# Patient Record
Sex: FEMALE | Race: WHITE
[De-identification: names, ages, dates, MRNs, and addresses within clinical notes are randomized per-mention and may not be internally consistent; named-entity substitution may affect disease eponyms.]

---

## 2017-07-05 ENCOUNTER — HOSPITAL ENCOUNTER (OUTPATIENT)
Dept: HOSPITAL 47 - RADCTMAIN | Age: 52
Discharge: HOME | End: 2017-07-05
Payer: COMMERCIAL

## 2017-07-05 DIAGNOSIS — M25.872: ICD-10-CM

## 2017-07-05 DIAGNOSIS — Z47.89: Primary | ICD-10-CM

## 2017-07-05 DIAGNOSIS — M79.5: ICD-10-CM

## 2017-07-05 DIAGNOSIS — Z98.1: ICD-10-CM

## 2017-07-05 DIAGNOSIS — Z98.890: ICD-10-CM

## 2017-07-05 NOTE — CT
EXAMINATION TYPE: CT foot LT wo con

 

DATE OF EXAM: 7/5/2017

 

COMPARISON: 11/6/2015

 

HISTORY: Patient complains of increased left foot pain and decreased range of motion.  Patient had a 
prior surgery.

 

Unenhanced CT of the left foot was performed in the axial coronal and sagittal planes. Bone and soft 
tissue window settings are submitted.

 

CT DLP: 87.6 mGycm

Automated exposure control for dose reduction was used.

 

FINDINGS: 

Screw defects are noted within the talus. Previously noted screws have been removed. There is a small
 radiopaque density measuring 1.9 mm and within the soft tissues at the first cuneiform navicular nicholas
nt space.There is fusion of  the talonavicular joint. There is severe degenerative narrowing involvin
g the intertarsal joints with vacuum changes identified as well as spurring noted. Degenerative narro
wing of the ankle mortise. Os calcis is intact and free of lesion or fracture. No acute fractures are
 identified. Degenerative narrowing involving the first metatarsal phalangeal joint without significa
nt hallux valgus deformity. Small plantar calcaneal spur identified.

 

IMPRESSION: 

 

1. REMOVAL OF FIXATION SCREWS WITH FUSION OF THE TALONAVICULAR JOINT

2. Severe degenerative changes of the midfoot.

3. Small focal radiopaque metallic foreign body within the soft tissues

## 2017-07-28 ENCOUNTER — HOSPITAL ENCOUNTER (OUTPATIENT)
Dept: HOSPITAL 47 - OR | Age: 52
LOS: 1 days | Discharge: HOME | End: 2017-07-29
Payer: COMMERCIAL

## 2017-07-28 VITALS — RESPIRATION RATE: 16 BRPM

## 2017-07-28 VITALS — BODY MASS INDEX: 39.6 KG/M2

## 2017-07-28 DIAGNOSIS — I10: ICD-10-CM

## 2017-07-28 DIAGNOSIS — K21.9: ICD-10-CM

## 2017-07-28 DIAGNOSIS — M13.872: Primary | ICD-10-CM

## 2017-07-28 DIAGNOSIS — Z79.82: ICD-10-CM

## 2017-07-28 DIAGNOSIS — Z79.899: ICD-10-CM

## 2017-07-28 DIAGNOSIS — E66.9: ICD-10-CM

## 2017-07-28 PROCEDURE — 81025 URINE PREGNANCY TEST: CPT

## 2017-07-28 PROCEDURE — 73620 X-RAY EXAM OF FOOT: CPT

## 2017-07-28 PROCEDURE — 82306 VITAMIN D 25 HYDROXY: CPT

## 2017-07-28 PROCEDURE — 28730 FUSION OF FOOT BONES: CPT

## 2017-07-28 PROCEDURE — 85025 COMPLETE CBC W/AUTO DIFF WBC: CPT

## 2017-07-28 PROCEDURE — 97162 PT EVAL MOD COMPLEX 30 MIN: CPT

## 2017-07-28 PROCEDURE — 28737 REVISION OF FOOT BONES: CPT

## 2017-07-28 RX ADMIN — POTASSIUM CHLORIDE SCH: 14.9 INJECTION, SOLUTION INTRAVENOUS at 20:04

## 2017-07-28 RX ADMIN — POTASSIUM CHLORIDE SCH MLS: 14.9 INJECTION, SOLUTION INTRAVENOUS at 11:24

## 2017-07-28 NOTE — P.OP
Date of Procedure: 07/28/17


Preoperative Diagnosis: 


1.  Diffuse left midfoot arthritis with symptoms primarily in the first and 

second tarsometatarsal joint and naviculocuneiform joint


2. Obesity with a BMI of 39.6


Postoperative Diagnosis: 


Same


Procedure(s) Performed: 


1.  Left first tarsometatarsal joint fusion


2.  Left second tarsometatarsal joint fusion


3.  Left naviculocuneiform joint fusion


Implants: 





Anesthesia: GETA


Surgeon: Barrington Lin


Assistant #1: Woodrow Craven


Estimated Blood Loss (ml): 250


IV fluids (ml): 900


Pathology: none sent


Condition: stable


Disposition: PACU


Indications for Procedure: 


The patient is a previously healthy 52-year-old female with a medical history 

significant for obesity with a BMI of 39.  The patient previously underwent a 

talonavicular fusion by Dr. Guerrero.  She went on to a solid fusion but 

unfortunately developed symptoms a chair be able to her hardware and adjacent 

joint arthritis.  I removed the hardware about a year ago which provided 

transient relief of her symptoms.  The patient went on to have increasing pain 

in her midfoot.  We initiated nonsurgical treatment including activity 

modification, orthotics, rocker-bottom shoes, and injections.  Most of the 

patient's symptoms were across the medial column of the foot and over the 

second tarsometatarsal joint.  She improved temporarily with injections.  She 

got to the point that she had minimal relief with nonsurgical treatment and 

requested surgery.  My proposed surgery was doing a fusion of the involved and 

most symptomatic joints.  A computed tomography scan was obtained to help 

identify which joints are most arthritic.  Based on my exam and imaging the 

most symptomatic joints included the first and second tarsometatarsal joint and 

the naviculocuneiform joint.  We discussed the potential risks and 

complications of an elective fusion procedure including but not limited to 

risks from anesthesia, risk of superficial infection, risk of deep infection, 

risk of delayed wound healing, risk of damage to local neurovascular structures

, risk of temporary or permanent numbness, risk of nonunion of the fusion sites

, risk of malunion of the fusion sites, risk of symptomatically hardware, risk 

of need for hardware removal, risk of further pain, risk of progression of 

adjacent joint arthritis, risk of difficulty walking, risk of inability to walk

, risk of inability of surgery to meet preoperative expectations, chronic pain, 

chronic swelling, generalized to satisfaction with surgery and possibly loss of 

life or limb.  The patient voiced her understanding of these particular that 

she may still have pain and stiffness due to having had multiple fusions in her 

foot.  She provided her verbal and written consent to go forward with surgery.


Operative Findings: 





Description of Procedure: 


The patient was identified in preoperative holding and the correct leg was 

marked with my initials  The patient had all of her questions answered.  I 

verified the consent form matched our surgical procedure planned.  The patient 

was then brought back to the operating room after popliteal and saphenous nerve 

block was placed in preoperative holding by anesthesia.  She was positioned on 

an operating room table and preoperative antibiotics and a general anesthetic 

were administered.  All bony prominences were well-padded.  A tourniquet was 

applied to the proximal aspect the left thigh.  Bone foam was placed under the 

left buttock internally rotating the leg.  Egg foam was placed under the right 

leg which was then secured to the table.  The patient's right leg was then 

prepped and draped in the standard sterile fashion.  Prior to starting surgery 

timeout was performed identifying the correct patient operative extremity and 

procedure.  The patient's leg was then elevated, exsanguinated with an Esmarch 

bandage, and the tourniquet was inflated to 250 mmHg.  





I began by outlining incisions over the patient's foot.  The patient had a 

longitudinal dorsal incision over the anterior aspect of the ankle and dorsal 

midfoot centered along the second ray from her prior fusion.  I did not feel I 

was able to reach the medial column from this so a straight medial approach 

centered over the medial column was marked out with a marker.  Skin incision 

was made a 15 blade scalpel.  Dissection was carried down carefully to the skin 

with tenotomy scissors.  Crossing veins were controlled with electrocautery.  

The first tarsometatarsal joint and medial aspect of the naviculocuneiform 

joint was exposed.  Both joints appeared to be markedly arthritic.  K wires 

were placed for a distractor and both joints were distracted.  The remaining 

articular cartilage was removed with a series of curettes and osteotomes.  Both 

joints were copiously irrigated.  Once all remaining cartilage had been removed 

a 2.0 mm drill bit was used to perforate the subchondral on of all exposed 

surfaces to help facilitate fusion. Augment was packed into the fusion sites to 

help facilitate fusion.  A precontoured medial column plate was then placed 

along the medial column and pinned into place.  The position of the plate was 

verified with fluoroscopy.  A nonlocking screw was placed proximally into the 

navicular bring the plate down to bone.  K wires were then placed the locking 

towers in the navicular and medial cuneiform.  Compression was applied across 

the joint through the K wires.  A locking screw was placed proximally in the 

plate into the navicular.  I then placed a nonlocking screw eccentric Cresencio in 

the oblong hole in the medial cuneiform generating additional compression.  I 

then placed a third and final locking screw proximally in the navicular.  I 

then proceeded to place 2 locking screws in the medial cuneiform.  A K wire was 

then placed 3 locking power distally in the first metatarsal.  A distractor was 

used generating compression between the K wire in the medial cuneiform and 

first metatarsal.  A nonlocking screw was placed eccentric Cresencio in the oblong 

hole of the first metatarsal generating additional compression.  I then placed 

an additional 2 locking screws in the first metatarsal.  Fluoroscopy was then 

brought in to verify hardware position and position of the fusion.  I was happy 

with the position of the plate and all of the joints appeared to be adequately 

compressed.





I then made a incision over the second ray over her previous scar.  Dissection 

was carried down carefully through subcutaneous tissue.  There is significant 

scarring.  The extensor tendons were identified and carefully retracted.  The 

neurovascular bundle was identified and was adherent to scar tissue.  This was 

carefully dissected and retracted.  The second tarsometatarsal joint was then 

exposed.  All the remaining bone was removed with an osteotome.  Osteophytes 

were removed with a Tarun.  A 2.0 mm drill bit was used to perforate the 

exposed surfaces. Augment was packed into the wound to help facilitate with our 

fusion.  A precontoured plate was placed over the dorsal aspect of the second 

tarsometatarsal joint.  The joint was pinned into place.  A 3.5 mm nonlocking 

screw was placed proximally into the middle cuneiform bring the plate down to 

bone.  A distractor was used generating compression across the second 

tarsometatarsal joint and the previously placed K wires.  I then placed a 3.5 

mm nonlocking screw eccentric Cresencio to the oblong hole generating additional 

compression across the second tarsometatarsal joint.  I then proceeded to place 

locking screws in the remaining holes of the plate.  C-arm fluoroscopy was 

brought in to take final x-rays including an AP, oblique and lateral x-ray of 

the foot.  I was happy with the position of the joints and hardware. The 

remaining Augment was packed against the fusion sites.





The periosteum over the medial column was closed with a running 0 Vicryl 

stitch.  The deep subcu was reapproximated using 2-0 Vicryl.  The skin was 

closed using 3-0 nylon horizontal mattress stitches.  The dorsal incision had 

the subcutaneous tissue reapproximated using 3-0 Vicryl and the skin was closed 

using 3-0 nylon horizontal mattress stitches.  I verified that all instrument, 

sponge, and sharp counts were correct.  A sterile dressing consisting of 

Betadine soaked Adaptic, 4 x 4, web roll was applied.  The drapes were taken 

down and a very well-padded bulky Stanley type splint was placed.  The patient 

was then awoken from her anesthetic and transferred to the PACU in stable 

condition.





Woodrow Craven was required as a skilled assistant for patient positioning, 

surgical exposure, preparation of joints, fusion, closure of wounds, and 

application of splint.  





Plan: The patient is going to be admitted overnight for pain control and IV 

antibiotics.  She is remain strictly nonweightbearing on her operative 

extremity.  She can discharge home when her pain is controlled with oral pain 

medications and she passes physical therapy.

## 2017-07-28 NOTE — FL
FLUOROSCOPY

 

42 seconds of fluoroscopy time were utilized during left foot surgery. 5 images document the procedur
e.

## 2017-07-28 NOTE — P.ONQ
Anesthesiology Proc Note - PNB





- Peripheral Nerve Block Performed


  ** Left Popliteal Single


Time Out Performed: Yes


Indication: Acute Post-Operative Pain, Analgesia


Sedation Type: Sedate with meaningful contact maintained


Preparation: Sterile Prep


Catheter: None


Needle Types: Other (see comment) (marie)


Needle Size: 100mm (4")


Needle Gauge: 21


Technique: Ultrasound


Injectate: Other (see comment) (lido 2% + fred 0.5% 15cc of each)


Adjunct: Epinephrine (see comment for dilution ratio) (1:200,000)


Blood Aspirated: No


Pain Paresthesia on Injection Noted: No


Resistance on Injection: Normal


Events: Uneventful and Well Tolerated

## 2017-07-29 VITALS — HEART RATE: 69 BPM | DIASTOLIC BLOOD PRESSURE: 66 MMHG | SYSTOLIC BLOOD PRESSURE: 117 MMHG | TEMPERATURE: 97.1 F

## 2017-07-29 LAB
BASOPHILS # BLD AUTO: 0 K/UL (ref 0–0.2)
BASOPHILS NFR BLD AUTO: 0 %
CH: 27.5
CHCM: 31.9
EOSINOPHIL # BLD AUTO: 0.1 K/UL (ref 0–0.7)
EOSINOPHIL NFR BLD AUTO: 1 %
ERYTHROCYTE [DISTWIDTH] IN BLOOD BY AUTOMATED COUNT: 3.53 M/UL (ref 3.8–5.4)
ERYTHROCYTE [DISTWIDTH] IN BLOOD: 15.5 % (ref 11.5–15.5)
HCT VFR BLD AUTO: 30.5 % (ref 34–46)
HDW: 2.6
HGB BLD-MCNC: 9.9 GM/DL (ref 11.4–16)
LUC NFR BLD AUTO: 1 %
LYMPHOCYTES # SPEC AUTO: 1.3 K/UL (ref 1–4.8)
LYMPHOCYTES NFR SPEC AUTO: 15 %
MCH RBC QN AUTO: 27.9 PG (ref 25–35)
MCHC RBC AUTO-ENTMCNC: 32.3 G/DL (ref 31–37)
MCV RBC AUTO: 86.5 FL (ref 80–100)
MONOCYTES # BLD AUTO: 0.5 K/UL (ref 0–1)
MONOCYTES NFR BLD AUTO: 5 %
NEUTROPHILS # BLD AUTO: 6.9 K/UL (ref 1.3–7.7)
NEUTROPHILS NFR BLD AUTO: 78 %
WBC # BLD AUTO: 0.13 10*3/UL
WBC # BLD AUTO: 8.9 K/UL (ref 3.8–10.6)
WBC (PEROX): 9.38

## 2017-07-29 RX ADMIN — POTASSIUM CHLORIDE SCH: 14.9 INJECTION, SOLUTION INTRAVENOUS at 11:20

## 2017-07-29 RX ADMIN — HYDROCODONE BITARTRATE AND ACETAMINOPHEN PRN EACH: 7.5; 325 TABLET ORAL at 05:21

## 2017-07-29 RX ADMIN — HYDROCODONE BITARTRATE AND ACETAMINOPHEN PRN EACH: 7.5; 325 TABLET ORAL at 11:49

## 2017-07-29 RX ADMIN — POTASSIUM CHLORIDE SCH: 14.9 INJECTION, SOLUTION INTRAVENOUS at 03:00

## 2017-07-29 NOTE — P.DS
Providers


Date of admission: 





7/28/2017


Expected date of discharge: 07/29/17


Attending physician: 


Barrington Lin





Consults: 





Internal Medicine


Physical Therapy


Primary care physician: 


Gypsy Salter





[facility] Course: 





The patient is a very pleasant 52-year-old female who previously had undergone 

a talonavicular fusion and went on to develop adjacent joint arthritis 

diffusely throughout her midfoot.  The patient was taken to the operating room 

yesterday where a midfoot fusion was performed.  The patient had a nerve block 

placed by anesthesia.  She did well overnight.  This morning she has very mild 

pain in her operative leg.  She denies chest pain or shortness of breath.  On 

exam she has a clean-appearing splint with no saturated blood or drainage.  Her 

toes are warm and well-perfused brisk capillary refill.  She has no pain with 

passive range of motion of her toes.  Sensation is intact to light touch in her 

toes.  She can actively plantarflex and dorsiflex her toes.


Procedures: 





Midfoot fusion





Plan - Discharge Summary


New Discharge Prescriptions: 


New


   Aspirin 325 mg PO BID #60 tab


   Docusate [Colace] 100 mg PO BID #60 capsule


   HYDROcodone/APAP 7.5-325MG [Norco 7.5-325] 1 - 2 tab PO Q6HR PRN #60 tab


     PRN Reason: Pain





No Action


   Multivitamins, Thera [Multivitamin (formulary)] 1 tab PO DAILY


   Hydrochlorothiazide [Hydrodiuril] 25 mg PO DAILY


   Aspirin [Adult Low Dose Aspirin EC] 81 mg PO DAILY


   Omeprazole [PriLOSEC] 20 mg PO AC-BRKFST


   Metoprolol Succinate [Toprol XL] 200 mg PO QAM


   Losartan [Cozaar] 50 mg PO HS


   Acetaminophen [Tylenol Extra Strength] 1,000 mg PO BID PRN


     PRN Reason: Pain


Discharge Medication List





Acetaminophen [Tylenol Extra Strength] 1,000 mg PO BID PRN 07/19/17 [History]


Aspirin [Adult Low Dose Aspirin EC] 81 mg PO DAILY 07/19/17 [History]


Hydrochlorothiazide [Hydrodiuril] 25 mg PO DAILY 07/19/17 [History]


Losartan [Cozaar] 50 mg PO HS 07/19/17 [History]


Metoprolol Succinate [Toprol XL] 200 mg PO QAM 07/19/17 [History]


Multivitamins, Thera [Multivitamin (formulary)] 1 tab PO DAILY 07/19/17 [History

]


Omeprazole [PriLOSEC] 20 mg PO AC-BRKFST 07/19/17 [History]


Aspirin 325 mg PO BID #60 tab 07/28/17 [Rx]


Docusate [Colace] 100 mg PO BID #60 capsule 07/28/17 [Rx]


HYDROcodone/APAP 7.5-325MG [Norco 7.5-325] 1 - 2 tab PO Q6HR PRN #60 tab 07/28/ 17 [Rx]








Follow up Appointment(s)/Referral(s): 


Barrington Lin MD [Medical Doctor] - 10 Days


Activity/Diet/Wound Care/Special Instructions: 


NWB


elevate leg


ice


keep clean and dry


maintain splint


F/U with Dr. Lin in office


Take meds as directed


Discharge Disposition: HOME SELF-CARE

## 2017-07-29 NOTE — P.PN
Progress Note - Text





0909 anesthesia POD 1.  Patient is status post multiple joint fusions of the 

left foot.  A left popliteal block was performed for postoperative pain relief.

  Patient reports good pain relief until approximately 3 AM this morning and 

took her first oral pain medication at approximately 4 AM.





Assessment: Adequate pain control without complications.

## 2018-02-19 ENCOUNTER — HOSPITAL ENCOUNTER (OUTPATIENT)
Dept: HOSPITAL 47 - RADMAMWWP | Age: 53
Discharge: HOME | End: 2018-02-19
Attending: INTERNAL MEDICINE
Payer: COMMERCIAL

## 2018-02-19 DIAGNOSIS — Z12.31: Primary | ICD-10-CM

## 2018-02-19 PROCEDURE — 77067 SCR MAMMO BI INCL CAD: CPT

## 2018-02-20 NOTE — MM
Reason for exam: screening  (asymptomatic).

Last mammogram was performed 1 year and 9 months ago.



History:

Patient is nulliparous.



Physical Findings:

A clinical breast exam by your physician is recommended on an annual basis and 

results should be correlated with mammographic findings.



MG Screening Mammo w CAD

Bilateral CC and MLO view(s) were taken.

Prior study comparison: May 24, 2016, bilateral MG screening mammo w CAD.  January 12, 2005, bilateral screening mammogram.

There are scattered fibroglandular densities.  There is chronic nodularity in the 

right breast.  No significant changes when compared with prior studies.





ASSESSMENT: Negative, BI-RAD 1



RECOMMENDATION:

Routine screening mammogram of both breasts in 1 year.

## 2020-08-17 NOTE — MM
Reason for exam: screening  (asymptomatic).

Last mammogram was performed 2 years and 6 months ago.



History:

Patient is nulliparous.



Physical Findings:

A clinical breast exam by your physician is recommended on an annual basis and 

results should be correlated with mammographic findings.



MG Screening Mammo w CAD

Bilateral CC and MLO view(s) were taken.

Prior study comparison: February 19, 2018, bilateral MG screening mammo w CAD.  

May 24, 2016, bilateral MG screening mammo w CAD.

There are scattered fibroglandular densities.  There is no discrete abnormality.  

No significant changes when compared with prior studies.





ASSESSMENT: Negative, BI-RAD 1



RECOMMENDATION:

Routine screening mammogram of both breasts in 1 year.

## 2023-10-10 ENCOUNTER — APPOINTMENT (RX ONLY)
Dept: URBAN - METROPOLITAN AREA CLINIC 148 | Facility: CLINIC | Age: 58
Setting detail: DERMATOLOGY
End: 2023-10-10

## 2023-10-10 DIAGNOSIS — L81.4 OTHER MELANIN HYPERPIGMENTATION: ICD-10-CM

## 2023-10-10 DIAGNOSIS — D22 MELANOCYTIC NEVI: ICD-10-CM

## 2023-10-10 DIAGNOSIS — D18.0 HEMANGIOMA: ICD-10-CM

## 2023-10-10 DIAGNOSIS — L91.8 OTHER HYPERTROPHIC DISORDERS OF THE SKIN: ICD-10-CM

## 2023-10-10 DIAGNOSIS — L72.0 EPIDERMAL CYST: ICD-10-CM

## 2023-10-10 PROBLEM — D22.4 MELANOCYTIC NEVI OF SCALP AND NECK: Status: ACTIVE | Noted: 2023-10-10

## 2023-10-10 PROBLEM — D22.39 MELANOCYTIC NEVI OF OTHER PARTS OF FACE: Status: ACTIVE | Noted: 2023-10-10

## 2023-10-10 PROBLEM — D48.5 NEOPLASM OF UNCERTAIN BEHAVIOR OF SKIN: Status: ACTIVE | Noted: 2023-10-10

## 2023-10-10 PROBLEM — D18.01 HEMANGIOMA OF SKIN AND SUBCUTANEOUS TISSUE: Status: ACTIVE | Noted: 2023-10-10

## 2023-10-10 PROBLEM — D22.122 MELANOCYTIC NEVI OF LEFT LOWER EYELID, INCLUDING CANTHUS: Status: ACTIVE | Noted: 2023-10-10

## 2023-10-10 PROBLEM — D22.5 MELANOCYTIC NEVI OF TRUNK: Status: ACTIVE | Noted: 2023-10-10

## 2023-10-10 PROCEDURE — ? SUNSCREEN RECOMMENDATIONS

## 2023-10-10 PROCEDURE — ? BIOPSY BY SHAVE METHOD

## 2023-10-10 PROCEDURE — ? COUNSELING

## 2023-10-10 PROCEDURE — 11102 TANGNTL BX SKIN SINGLE LES: CPT

## 2023-10-10 PROCEDURE — ? FULL BODY SKIN EXAM - DECLINED

## 2023-10-10 PROCEDURE — 99203 OFFICE O/P NEW LOW 30 MIN: CPT | Mod: 25

## 2023-10-10 ASSESSMENT — LOCATION SIMPLE DESCRIPTION DERM
LOCATION SIMPLE: LEFT UPPER BACK
LOCATION SIMPLE: UPPER BACK
LOCATION SIMPLE: ABDOMEN
LOCATION SIMPLE: RIGHT FOREARM
LOCATION SIMPLE: NECK
LOCATION SIMPLE: TRAPEZIAL NECK
LOCATION SIMPLE: RIGHT CHEEK
LOCATION SIMPLE: RIGHT UPPER BACK
LOCATION SIMPLE: LEFT EYELID
LOCATION SIMPLE: LEFT FOREARM

## 2023-10-10 ASSESSMENT — LOCATION ZONE DERM
LOCATION ZONE: TRUNK
LOCATION ZONE: NECK
LOCATION ZONE: FACE
LOCATION ZONE: EYELID
LOCATION ZONE: ARM

## 2023-10-10 ASSESSMENT — LOCATION DETAILED DESCRIPTION DERM
LOCATION DETAILED: RIGHT SUPERIOR MEDIAL UPPER BACK
LOCATION DETAILED: LEFT CENTRAL LATERAL NECK
LOCATION DETAILED: LEFT DISTAL DORSAL FOREARM
LOCATION DETAILED: INFERIOR THORACIC SPINE
LOCATION DETAILED: RIGHT DISTAL DORSAL FOREARM
LOCATION DETAILED: RIGHT MEDIAL MALAR CHEEK
LOCATION DETAILED: RIGHT CENTRAL LATERAL NECK
LOCATION DETAILED: EPIGASTRIC SKIN
LOCATION DETAILED: LEFT MEDIAL CANTHUS
LOCATION DETAILED: MID TRAPEZIAL NECK
LOCATION DETAILED: RIGHT SUPERIOR MEDIAL MALAR CHEEK
LOCATION DETAILED: LEFT MEDIAL UPPER BACK